# Patient Record
Sex: MALE | URBAN - METROPOLITAN AREA
[De-identification: names, ages, dates, MRNs, and addresses within clinical notes are randomized per-mention and may not be internally consistent; named-entity substitution may affect disease eponyms.]

---

## 2018-01-30 ENCOUNTER — OFFICE VISIT (OUTPATIENT)
Dept: FAMILY MEDICINE | Facility: CLINIC | Age: 38
End: 2018-01-30
Payer: MEDICAID

## 2018-01-30 VITALS
DIASTOLIC BLOOD PRESSURE: 70 MMHG | HEART RATE: 72 BPM | RESPIRATION RATE: 16 BRPM | SYSTOLIC BLOOD PRESSURE: 112 MMHG | HEIGHT: 78 IN | WEIGHT: 283 LBS | BODY MASS INDEX: 32.74 KG/M2 | TEMPERATURE: 98 F

## 2018-01-30 DIAGNOSIS — K21.9 GASTROESOPHAGEAL REFLUX DISEASE WITHOUT ESOPHAGITIS: ICD-10-CM

## 2018-01-30 DIAGNOSIS — M45.0 ANKYLOSING SPONDYLITIS OF MULTIPLE SITES IN SPINE: ICD-10-CM

## 2018-01-30 DIAGNOSIS — Z00.00 WELL ADULT EXAM: Primary | ICD-10-CM

## 2018-01-30 DIAGNOSIS — E66.9 OBESITY (BMI 30.0-34.9): ICD-10-CM

## 2018-01-30 DIAGNOSIS — H20.021 RECURRENT IRITIS OF RIGHT EYE: ICD-10-CM

## 2018-01-30 PROBLEM — M45.9 ANKYLOSING SPONDYLITIS: Status: ACTIVE | Noted: 2018-01-30

## 2018-01-30 PROBLEM — H20.029 IRITIS, RECURRENT: Status: ACTIVE | Noted: 2018-01-30

## 2018-01-30 PROBLEM — H20.9 IRITIS OF LEFT EYE: Status: ACTIVE | Noted: 2018-01-30

## 2018-01-30 LAB
BUN SERPL-MCNC: 14 MG/DL (ref 8–20)
CALCIUM SERPL-MCNC: 9.3 MG/DL (ref 7.7–10.4)
CHLORIDE: 105 MMOL/L (ref 98–110)
CO2 SERPL-SCNC: 26.7 MMOL/L (ref 22.8–31.6)
CREATININE: 1.02 MG/DL (ref 0.6–1.4)
GLUCOSE: 92 MG/DL (ref 70–99)
HBA1C MFR BLD: 5.1 % (ref 3.1–6.5)
POTASSIUM SERPL-SCNC: 4.1 MMOL/L (ref 3.5–5)
SODIUM: 140 MMOL/L (ref 134–144)

## 2018-01-30 PROCEDURE — 99395 PREV VISIT EST AGE 18-39: CPT | Mod: ,,, | Performed by: FAMILY MEDICINE

## 2018-01-30 RX ORDER — OMEPRAZOLE 40 MG/1
40 CAPSULE, DELAYED RELEASE ORAL DAILY
Qty: 30 CAPSULE | Refills: 11 | Status: SHIPPED | OUTPATIENT
Start: 2018-01-30 | End: 2019-07-22

## 2018-01-30 NOTE — PROGRESS NOTES
Subjective:       Patient ID: Davon Chery is a 37 y.o. male.    Chief Complaint: Annual Exam      Gastroesophageal Reflux   He reports no abdominal pain, no chest pain, no choking, no coughing, no nausea, no sore throat or no wheezing. This is a recurrent problem. Episode onset: years. The problem has been gradually worsening. Pertinent negatives include no fatigue.       Allergies and Medications:   Review of patient's allergies indicates:  No Known Allergies  Current Outpatient Prescriptions   Medication Sig Dispense Refill    omeprazole (PRILOSEC) 40 MG capsule Take 1 capsule (40 mg total) by mouth once daily. 30 capsule 11     No current facility-administered medications for this visit.        Family History:   History reviewed. No pertinent family history.    Social History:   Social History     Social History    Marital status: Single     Spouse name: N/A    Number of children: N/A    Years of education: N/A     Occupational History    Not on file.     Social History Main Topics    Smoking status: Never Smoker    Smokeless tobacco: Never Used    Alcohol use No    Drug use: No    Sexual activity: Not on file     Other Topics Concern    Not on file     Social History Narrative    No narrative on file       Review of Systems   Constitutional: Positive for unexpected weight change (20# gain over 18 months). Negative for activity change, appetite change, chills, diaphoresis, fatigue and fever.   HENT: Negative for congestion, dental problem, drooling, ear discharge, ear pain, facial swelling, hearing loss, mouth sores, nosebleeds, postnasal drip, rhinorrhea, sinus pain, sinus pressure, sneezing, sore throat, tinnitus, trouble swallowing and voice change.    Eyes: Negative for photophobia, pain, discharge, redness, itching and visual disturbance.        Ha iiritis in summer, resolved in one weeks   Respiratory: Negative for apnea, cough, choking, chest tightness, shortness of breath, wheezing and  stridor.    Cardiovascular: Negative for chest pain, palpitations and leg swelling.   Gastrointestinal: Positive for rectal pain (itching). Negative for abdominal distention, abdominal pain, anal bleeding, blood in stool, constipation, diarrhea, nausea and vomiting.        Gerd, chest discomfort.   Endocrine: Positive for cold intolerance. Negative for heat intolerance, polydipsia, polyphagia and polyuria.   Genitourinary: Negative for decreased urine volume, difficulty urinating, discharge, dysuria, enuresis, flank pain, frequency, genital sores, hematuria, penile pain, penile swelling, scrotal swelling, testicular pain and urgency.   Musculoskeletal: Negative for arthralgias, back pain, gait problem, joint swelling, myalgias, neck pain and neck stiffness.   Skin: Negative for color change, pallor, rash and wound.   Allergic/Immunologic: Negative for environmental allergies, food allergies and immunocompromised state.   Neurological: Negative for dizziness, tremors, seizures, syncope, facial asymmetry, speech difficulty, weakness, light-headedness, numbness and headaches.   Hematological: Negative for adenopathy. Does not bruise/bleed easily.   Psychiatric/Behavioral: Positive for sleep disturbance (snoozing). Negative for agitation, behavioral problems, confusion, decreased concentration, dysphoric mood, hallucinations, self-injury and suicidal ideas. The patient is not nervous/anxious and is not hyperactive.        Objective:     Vitals:    01/30/18 0857   BP: 112/70   Pulse: 72   Resp: 16   Temp: 97.5 °F (36.4 °C)        Physical Exam   Constitutional: He is oriented to person, place, and time. He appears well-developed and well-nourished.  Non-toxic appearance. He does not have a sickly appearance. He does not appear ill. No distress.   HENT:   Head: Normocephalic and atraumatic.   Right Ear: External ear normal.   Left Ear: External ear normal.   Nose: Nose normal.   Mouth/Throat: Oropharynx is clear and  moist. No oropharyngeal exudate.   Eyes: Conjunctivae and EOM are normal. Pupils are equal, round, and reactive to light. Right eye exhibits no discharge. Left eye exhibits no discharge. No scleral icterus.   Neck: Normal range of motion. Neck supple. No JVD present. No tracheal deviation present. No thyromegaly present.   Cardiovascular: Normal rate, normal heart sounds and intact distal pulses.  Exam reveals no gallop and no friction rub.    No murmur heard.  Pulmonary/Chest: Effort normal and breath sounds normal. No stridor. No respiratory distress. He has no wheezes. He has no rales. He exhibits no tenderness.   Abdominal: Soft. Bowel sounds are normal. He exhibits no distension and no mass. There is no tenderness. There is no rebound and no guarding. No hernia.   Genitourinary: Rectum normal, prostate normal, testes normal and penis normal. Rectal exam shows guaiac negative stool. Cremasteric reflex is present. Right testis shows no mass, no swelling and no tenderness. Right testis is descended. Cremasteric reflex is not absent on the right side. Left testis shows no mass, no swelling and no tenderness. Left testis is descended. Cremasteric reflex is not absent on the left side. Circumcised. No phimosis, paraphimosis, hypospadias, penile erythema or penile tenderness. No discharge found.   Musculoskeletal: He exhibits no edema, tenderness or deformity.   Lymphadenopathy:     He has no cervical adenopathy.   Neurological: He is alert and oriented to person, place, and time. He has normal reflexes. He displays normal reflexes. No cranial nerve deficit. He exhibits normal muscle tone. Coordination normal.   Skin: Skin is warm and dry. No rash noted. He is not diaphoretic. No erythema. No pallor.   Psychiatric: He has a normal mood and affect. His behavior is normal. Judgment and thought content normal.   Nursing note and vitals reviewed.      Assessment:       1. Well adult exam    2. Gastroesophageal reflux  disease without esophagitis    3. Obesity (BMI 30.0-34.9)    4. Recurrent iritis of right eye    5. Ankylosing spondylitis of multiple sites in spine        Plan:       Davon was seen today for annual exam.    Diagnoses and all orders for this visit:    Well adult exam  -     Hemoglobin A1c; Future  -     Lipid panel; Future  -     Basic metabolic panel; Future  -     Hemoglobin A1c  -     Lipid panel  -     Basic metabolic panel    Gastroesophageal reflux disease without esophagitis  -     omeprazole (PRILOSEC) 40 MG capsule; Take 1 capsule (40 mg total) by mouth once daily.    Obesity (BMI 30.0-34.9)    Recurrent iritis of right eye  -     Rheumatoid factor; Future  -     JEANNETTE; Future  -     CBC auto differential; Future  -     Ambulatory consult to Rheumatology  -     Rheumatoid factor  -     JEANNETTE    Ankylosing spondylitis of multiple sites in spine  -     Rheumatoid factor; Future  -     JEANNETTE; Future  -     CBC auto differential; Future  -     Ambulatory consult to Rheumatology  -     Rheumatoid factor  -     JEANNETTE         Follow-up in about 1 year (around 1/30/2019), or if symptoms worsen or fail to improve.

## 2018-01-30 NOTE — PATIENT INSTRUCTIONS
Diabetes: Learning About Serving and Portion Sizes     A good rule of thumb: Devote half your plate to vegetables and green salad. Split the other half between protein and starchy carbohydrates. Fruit makes a good dessert.     Servings and portions. Whats the difference? These terms can be very confusing. But learning to measure serving sizes can help you figure out how many carbohydrates (carbs) and other foods you eat each day. They are also powerful tools for managing your weight.  Servings and portions  Many different words are used to describe amounts of food. If your health care provider uses a term youre not sure of, dont be afraid to ask. It helps to know the difference between servings and portions:  · A serving size is a fixed size. Food producers use this term to describe their products. For example, the label on a cereal box could say that 1 cup of dry cereal = 1 serving.  · A portion (also called a helping) is how much you eat or how much you put on your plate at a meal. For example, you might eat 2 cups of cereal at breakfast.  Using serving information  The portion you choose to eat (such as 2 cups of cereal) may be more than one serving as listed on the food label (such as 1 cup of cereal). Thats why it helps to measure or weigh the food you eat. Because the food label values are based on servings, youll need to know how many servings you eat at one sitting.     Ounces: 2 to 3 ounces is about the size of your palm.       1 Cup: 1 cup (or a medium-sized piece) is about the size of your fist.       1/2 Cup: 1/2 cup is about the size of your cupped hand.      One tablespoon is about the size of your thumb.  One teaspoon is about the size of the tip of your thumb.  Keeping track of serving sizes  When youre planning for a snack or a meal, keep servings in mind. If you dont have measuring cups or a scale handy, there are ways to eyeball serving sizes, such as comparing your food to the size  of your hand (see pictures above).  Managing portion sizes  If your weight is a concern, reducing your portions can help. You can eat more than one serving of a food at once. But to keep from eating too much at one meal, learn how to manage your portions. A portion is the amount of each type of food on your plate. See the plate diagram for an example of balanced portions.  Date Last Reviewed: 3/1/2016  © 2730-0674 [employer]. [address] [place] [address], [place] [address]. All rights reserved. This information is not intended as a substitute for professional medical care. Always follow your healthcare professional's instructions.

## 2018-01-31 LAB — RHEUMATOID FACT SERPL-ACNC: <10 IU/ML (ref 0–13.9)

## 2018-02-15 ENCOUNTER — OFFICE VISIT (OUTPATIENT)
Dept: RHEUMATOLOGY | Facility: CLINIC | Age: 38
End: 2018-02-15
Payer: MEDICAID

## 2018-02-15 VITALS
SYSTOLIC BLOOD PRESSURE: 118 MMHG | BODY MASS INDEX: 32.97 KG/M2 | HEIGHT: 78 IN | WEIGHT: 285 LBS | DIASTOLIC BLOOD PRESSURE: 78 MMHG

## 2018-02-15 DIAGNOSIS — M45.9 ANKYLOSING SPONDYLITIS, UNSPECIFIED SITE OF SPINE: Primary | ICD-10-CM

## 2018-02-15 DIAGNOSIS — Z79.899 ENCOUNTER FOR LONG-TERM (CURRENT) DRUG USE: ICD-10-CM

## 2018-02-15 DIAGNOSIS — K21.9 GASTROESOPHAGEAL REFLUX DISEASE WITHOUT ESOPHAGITIS: ICD-10-CM

## 2018-02-15 LAB
ALBUMIN SERPL-MCNC: 4.2 G/DL (ref 3.1–4.7)
ALP SERPL-CCNC: 69 IU/L (ref 40–104)
ALT (SGPT): 31 IU/L (ref 3–33)
AST SERPL-CCNC: 28 IU/L (ref 10–40)
BASOPHILS NFR BLD: 0 K/UL (ref 0–0.2)
BASOPHILS NFR BLD: 0.4 %
BILIRUB SERPL-MCNC: 0.6 MG/DL (ref 0.3–1)
BUN SERPL-MCNC: 15 MG/DL (ref 8–20)
CALCIUM SERPL-MCNC: 9.3 MG/DL (ref 7.7–10.4)
CHLORIDE: 106 MMOL/L (ref 98–110)
CO2 SERPL-SCNC: 25.2 MMOL/L (ref 22.8–31.6)
CREATININE: 0.97 MG/DL (ref 0.6–1.4)
CRP SERPL-MCNC: 0.96 MG/DL (ref 0–1.4)
EOSINOPHIL NFR BLD: 0.2 K/UL (ref 0–0.7)
EOSINOPHIL NFR BLD: 2.1 %
ERYTHROCYTE [DISTWIDTH] IN BLOOD BY AUTOMATED COUNT: 13.4 % (ref 11.7–14.9)
GLUCOSE: 90 MG/DL (ref 70–99)
GRAN #: 4.5 K/UL (ref 1.4–6.5)
GRAN%: 63.1 %
HCT VFR BLD AUTO: 44.9 % (ref 39–55)
HGB BLD-MCNC: 15.4 G/DL (ref 14–16)
IMMATURE GRANS (ABS): 0 K/UL (ref 0–1)
IMMATURE GRANULOCYTES: 0.3 %
LYMPH #: 1.9 K/UL (ref 1.2–3.4)
LYMPH%: 26.9 %
MCH RBC QN AUTO: 28.8 PG (ref 25–35)
MCHC RBC AUTO-ENTMCNC: 34.3 G/DL (ref 31–36)
MCV RBC AUTO: 84.1 FL (ref 80–100)
MONO #: 0.5 K/UL (ref 0.1–0.6)
MONO%: 7.2 %
NUCLEATED RBCS: 0 %
PLATELET # BLD AUTO: 256 K/UL (ref 140–440)
PMV BLD AUTO: 11.4 FL (ref 8.8–12.7)
POTASSIUM SERPL-SCNC: 3.8 MMOL/L (ref 3.5–5)
PROT SERPL-MCNC: 7.5 G/DL (ref 6–8.2)
RBC # BLD AUTO: 5.34 M/UL (ref 4.3–5.9)
SODIUM: 139 MMOL/L (ref 134–144)
WBC # BLD AUTO: 7.2 K/UL (ref 5–10)

## 2018-02-15 PROCEDURE — 3008F BODY MASS INDEX DOCD: CPT | Mod: ,,, | Performed by: INTERNAL MEDICINE

## 2018-02-15 PROCEDURE — 99204 OFFICE O/P NEW MOD 45 MIN: CPT | Mod: ,,, | Performed by: INTERNAL MEDICINE

## 2018-02-15 NOTE — PROGRESS NOTES
[facility] RHEUMATOLOGY           New patient visit      Subjective:       Patient ID:   NAME: Davon Chery : 1980     37 y.o. male    Referring Doc: Jean-Paul Ibrahim MD  Other Physicians:    Chief Complaint:  Consult (new patient = referred by Dr. Hernandes); Ankylosing Spondylitis; and Iritis        HPI:   This pleasant young man was referred to me by his primary provider, Dr. Jean-Paul Ibrahim. He patient states he has been carrying a diagnosis of ankylosing spondylitis for approximately 10 years. Throughout that period, it seems he was treated with over-the-counter anti-inflammatories on an as-needed basis. He has had very little in the way of back pain or peripheral joint pain and nothing in the way of red, hot, and/or swollen joints. His symptoms possibly referable to the ankylosing spondylitis are limited to several outbreaks of rather significant iritis. He has also had multiple periods of heel pain which of course may suggest enthesitis. The patient states that he does have some degree of stiffness in the back that lasts approximately 30 minutes. He does not take any over-the-counter medication. He is not limited in any way by this process in terms of his job as an .   His past medical history is positive only for GERD.   The review of systems is entirely negative with regard to rheumatologic phenomena with the exception of the eye inflammation and the heel pain.    ROS:   GEN:      no fever, night sweats or weight loss  SKIN:     no rashes , erythema, bruising, or swelling, no Raynauds, no photosensitivity  HEENT: no HAs, no changes in vision, no mouth ulcers, no sicca symptoms, no scalp tenderness, jaw claudication.  CV:        no CP, SOB, PND, JETT or orthopnea,no palpitations  PULM:   no SOB, cough, hemoptysis, sputum or pleuritic pain  GI:         no abdominal pain, nausea, vomiting, constipation, diarrhea, melanotic stools, BRBPR, or hematemesis.no dysphagia  :       no hematuria,  "dysuria  NEURO:no paresthesias, headaches, visual disturbances, muscle weakness  MUSCULOSKELETAL:  No complaints of muscle or joint pain. 30 minutes of back stiffness each day  PSYCH: No insomnia, no significant depression, no anxiety    Medications:    Current Outpatient Prescriptions:     omeprazole (PRILOSEC) 40 MG capsule, Take 1 capsule (40 mg total) by mouth once daily., Disp: 30 capsule, Rfl: 11    FAMILY HISTORY: negative for Connective Tissue Disease  There are no end exam questions for this visit.      Review of patient's allergies indicates:  No Known Allergies          Objective:     Vitals:  Blood pressure 118/78, height 6' 7" (2.007 m), weight 129.3 kg (285 lb).    Physical Examination:   GEN:    wn/wd male in no apparent distress  SKIN:    no rashes, no lesions, no sclerodactyly, no Raynaud's, no periungual erythema  HEAD:   no alopecia, no scalp tenderness, no temporal artery tenderness or induration.  EYES:   no pallor, no icterus, PERRLA. No evidence of inflammation  ENT:     no thrush, no mucosal dryness or ulcerations, adequate oral hygiene & dentition.  NECK:  supple x 6, no masses, no thyromegaly, no lymphadenopathy.  CV:        S1 and S2 regular, no murmurs, gallop or rubs  CHEST: Normal respiratory effort;  normal breath sounds/no adventitious sounds. No signs of consolidation.  ABD:      non-tender and non-distended; soft; normal bowel sounds; no rebound/guarding or tenderness. No hepatosplenomegaly.  Musculoskeletal:  There is no evidence of limitation of range of motion of the spine. Posture is entirely normal. There is no evidence of peripheral inflammatory disease  EXTREM: no clubbing, cyanosis or edema. normal pulses.  NEURO: grossly intact; motor/sensory WNL; AAOx3; no tremors  PSYCH:  normal mood, affect and behavior            Labs:   No results found for: WBC, HGB, HCT, MCV, PLTCMP@  Sodium   Date Value Ref Range Status   01/30/2018 140 134 - 144 mmol/L      Potassium   Date Value " Ref Range Status   01/30/2018 4.1 3.5 - 5.0 mmol/L      Chloride   Date Value Ref Range Status   01/30/2018 105 98 - 110 mmol/L      CO2   Date Value Ref Range Status   01/30/2018 26.7 22.8 - 31.6 mmol/L      Glucose   Date Value Ref Range Status   01/30/2018 92 70 - 99 mg/dL      BUN, Bld   Date Value Ref Range Status   01/30/2018 14 8 - 20 mg/dL      Creatinine   Date Value Ref Range Status   01/30/2018 1.02 0.60 - 1.40 mg/dL      Calcium   Date Value Ref Range Status   01/30/2018 9.3 7.7 - 10.4 mg/dL      Rheumatoid Factor   Date Value Ref Range Status   01/30/2018 <10.0 0.0 - 13.9 IU/mL      Comment:     Performed at: [facility] [address], [id number] Jo MD, Phone:  [phone]         Radiology/Diagnostic Studies:    none    Assessment/Discussion/Plan:   37 y.o. male with a history of ankylosing spondylitis with recurrent iritis and likely enthesitis.      PLAN:  I have discussed with the patient the course of this disease The choices for treatment at this point would be anti-inflammatory medication and/or sulfasalazine or methotrexate.  The anti-inflammatories are unlikely to yield a great deal of benefit and of course, as he has GERD, it would probably be best to stay away from them. I am not entirely certain that he would benefit from either of the 2 drugs mentioned as his symptoms appear to be extra-articular. I might be willing however to start a course of methotrexate and see if we can eliminate the iritis attacks. I would prefer however to try and get him on a biologic such as Enbrel which is more likely to have benefits for both the central and peripheral symptoms. He does not have insurance and actually that may be helpful in securing a nimesh from the  for the medication.  I have given him literature on the disease and on Enbrel. I have asked him to consider the treatment and that we will discuss it at his next visit.   I did get labs for a B  27 and his C-reactive protein.      RTC:  I will see him back in 1 month.      Electronically signed by Karsten Ellis MD

## 2018-02-15 NOTE — PATIENT INSTRUCTIONS
Iritis    Ankylosing Spondylitis in Adults  Arthritis is a disease that affects joints in your body. Ankylosing spondylitis (AS) is a type of arthritis that attacks the spine. The name comes from the [place] language. Ankylosing means stiffening of the joints. Spondyl refers to the spine, or vertebrae.  What causes Ankylosing spondylitis?  Healthcare providers dont know exactly what causes AS. Genes may play a role. Thats because almost all cases of AS happen in people with a gene called HLA-B27. But only a small percentage of people with this gene actually get AS.  Young and old people can develop AS. But its more common in people ages 17 to 35. Men are more likely than women to have AS. You are also more likely to have it if someone else in your family had it.  Symptoms  Like other types of arthritis, AS causes pain and stiffness. The spine and other nearby joints, such as the hip, become inflamed. In serious cases, the disease may break down the joints. Bones may even fuse together.  Symptoms of AS may come and go. They often include:  Back pain, especially in the morning when waking up from sleep  Body aches, such as in the legs, shoulders, buttocks, or heels  Stiffness in the morning  Stooped posture to ease pain  Problems inhaling deeply if AS affects the joints between the ribs and the spine  Lack of appetite  Fatigue  Fever  Anemia  Some people with AS also have skin rashes and stomach illnesses. They may have eye problems, too. These include pain, redness, and sensitivity to light. Severe cases of the disease may damage organs such as the heart and lungs. Osteoporosis happens in about 50% of AS patients.   Diagnosing Ankylosing spondylitis  To diagnose AS, your healthcare provider will start with a physical exam. He or she will ask about your symptoms and medical history. X-rays of your spine and other joints may show joint damage. MRI testing (magnetic resonance imaging) may be done as well. Genetic  testing can find out if you have the HLA-B27 gene.  Your healthcare provider may also recommend a lab test that checks for inflammation.  An erythrocyte sedimentation rate test measures how quickly red blood cells fall to the bottom of a test tube. If you have inflammation from arthritis, the red blood cells will clump together and fall faster.  Treating Ankylosing spondylitis  AS cant be cured. But treatments can ease pain and stiffness. They can help you live a more active life. Your healthcare provider will choose the best treatment based on your overall health, the severity of your disease, and other factors.  Several types of medicine can reduce pain and inflammation. These medicines include:  Nonsteroidal anti-inflammatory drugs (NSAIDs), such as naproxen or ibuprofen  Corticosteroids, which can be injected into affected joints and areas.   Muscle relaxants  Biologic medicines  Disease-modifying antirheumatic drugs (DMARDs)  Alternative treatments may also be helpful. Speak with your healthcare provider about the potential benefits of acupuncture, massage, yoga, and TENS units (transcutaneous electrical nerve stimulation).  Quitting smoking may help.   Your healthcare provider may also recommend surgery. For instance, you may need to have a joint replaced. Other procedures remove damaged bone or insert rods into the spine.   Exercising regularly can help relieve your symptoms. Be sure to include activities that strengthen the back and increase flexibility and range of motion. Your healthcare provider may also suggest physical therapy. Maintaining proper posture is important, too.  Date Last Reviewed: 2/3/2016  © 0601-7020 [employer] [address] [place] [address], [place] [address]. All rights reserved. This information is not intended as a substitute for professional medical care. Always follow your healthcare professional's instructions.        The iris is the colored part of the eye that controls  the size of the pupil. Iritis is an inflammation of the iris. It can be caused by injury to the eye or disease elsewhere in the body. Diseases linked to iritis include gout, Lyme disease, inflammatory bowel disease, or and rheumatoid arthritis. Often, no cause is found.  Iritis usually develops suddenly in one eye. Symptoms include redness, pain in the eye or brow region, sensitivity to light, and blurred vision.  Iritis is treated with eye drops to dilate the pupil and reduce pain. This will cause your vision to be blurred from a few hours up to a week, depending on the medicine used. Steroid drops may also be prescribed, to reduce pain from inflammation. Iritis from eye injury usually resolves in 1 to 2 weeks. Iritis from other causes may take several weeks to months to resolve.  Home care  · Use eye drops as prescribed.  · Wear sunglasses to decrease light sensitivity and discomfort.  · If your eye is dilated or if you have been given an eye patch, your driving ability will be affected. Do not drive until the blurred vision wears off and you no longer need an eye patch.  · You may use acetaminophen or ibuprofen to control pain, unless another medicine was prescribed. (Note: If you have chronic liver or kidney disease, or if you have ever had a stomach ulcer or gastrointestinal bleeding, talk with your doctor before using these medicines.)  Follow-up care  Follow up with your healthcare provider, or as advised.  When to seek medical advice  Contact your healthcare provider right away if any of these occur:  · Symptoms do not start to improve after 1 week.  · Iritis from eye injury causes symptoms for more than 2 weeks.  · Pain increases.  · Your eye becomes red.  · You lose some or all of your vision.  Date Last Reviewed: 6/22/2015  © 0859-1106 [employer]. [address] [place] [address], [place] [address]. All rights reserved. This information is not intended as a substitute for professional medical care.  Always follow your healthcare professional's instructions.

## 2018-02-16 LAB — CCP ANTIBODIES IGG/IGA: 4 UNITS (ref 0–19)

## 2018-02-21 LAB — HLA B27 INTERPRETATION: POSITIVE

## 2018-03-19 ENCOUNTER — OFFICE VISIT (OUTPATIENT)
Dept: RHEUMATOLOGY | Facility: CLINIC | Age: 38
End: 2018-03-19
Payer: MEDICAID

## 2018-03-19 VITALS
BODY MASS INDEX: 32.64 KG/M2 | DIASTOLIC BLOOD PRESSURE: 82 MMHG | SYSTOLIC BLOOD PRESSURE: 116 MMHG | WEIGHT: 289.69 LBS

## 2018-03-19 DIAGNOSIS — Z15.89 HLA B27 (HLA B27 POSITIVE): Primary | ICD-10-CM

## 2018-03-19 DIAGNOSIS — M45.9 ANKYLOSING SPONDYLITIS, UNSPECIFIED SITE OF SPINE: ICD-10-CM

## 2018-03-19 PROCEDURE — 99214 OFFICE O/P EST MOD 30 MIN: CPT | Mod: ,,, | Performed by: INTERNAL MEDICINE

## 2018-03-19 RX ORDER — CHOLECALCIFEROL (VITAMIN D3) 125 MCG
CAPSULE ORAL
COMMUNITY
End: 2018-04-10 | Stop reason: ALTCHOICE

## 2018-03-19 NOTE — PROGRESS NOTES
The patient returned today for a discussion of the test results and the treatment plan.    He moved the Aleve, 440 mg, to bedtime and has noted a great deal of difference from that. He has virtually no morning stiffness now and has improved energy throughout the day-no doubt because he is sleeping better.    I informed him of the blood test results which showed a positive HLA-B27 but little else.  I also discussed the x-ray results with him which are entirely normal and do not yet indicate ankylosing spondylitis damage.    The patient states a preference for taking no medication other than Aleve at this time. He states that he feels perfectly well  and that he is not fond of taking medications. I did explain about the potential risks from recurrent iritis or enthesitis apparently,  that is a risk he is willing to take at this time.    Given his present feeling, there is no reason for him to regularly follow-up with me. I did make it clear that he is welcome to return at any time.  I have asked him to be certain to let me know if there are further outbreaks, particularly of the iritis.

## 2018-04-10 ENCOUNTER — OFFICE VISIT (OUTPATIENT)
Dept: FAMILY MEDICINE | Facility: CLINIC | Age: 38
End: 2018-04-10
Payer: MEDICAID

## 2018-04-10 VITALS
BODY MASS INDEX: 33.21 KG/M2 | HEART RATE: 74 BPM | SYSTOLIC BLOOD PRESSURE: 112 MMHG | RESPIRATION RATE: 16 BRPM | HEIGHT: 78 IN | WEIGHT: 287 LBS | DIASTOLIC BLOOD PRESSURE: 84 MMHG | TEMPERATURE: 97 F

## 2018-04-10 DIAGNOSIS — K21.9 GASTROESOPHAGEAL REFLUX DISEASE WITHOUT ESOPHAGITIS: Primary | ICD-10-CM

## 2018-04-10 DIAGNOSIS — E66.9 OBESITY (BMI 30.0-34.9): ICD-10-CM

## 2018-04-10 PROCEDURE — 99213 OFFICE O/P EST LOW 20 MIN: CPT | Mod: ,,, | Performed by: FAMILY MEDICINE

## 2018-04-10 NOTE — PROGRESS NOTES
"Subjective:       Patient ID: Davon Chery is a 37 y.o. male.    Chief Complaint: Follow-up (from rheumatologist )      Patient comes in for a "follow-up today" my last visit with him was in January and I referred him to Dr. fabien elliotttis he did receive steroid drops and an evaluation which came up with the subclinical ankylosing spondylitis he opted to not treated aggressively and is doing relatively well his iritis has resolved. His last blood work check his cholesterol and blood sugars were normal and this will follow be followed up on an annual basis.        Allergies and Medications:   Review of patient's allergies indicates:  No Known Allergies  Current Outpatient Prescriptions   Medication Sig Dispense Refill    NAPROXEN SODIUM (ALEVE ORAL) Take by mouth.      omeprazole (PRILOSEC) 40 MG capsule Take 1 capsule (40 mg total) by mouth once daily. 30 capsule 11     No current facility-administered medications for this visit.        Family History:   History reviewed. No pertinent family history.    Social History:   Social History     Social History    Marital status:      Spouse name: N/A    Number of children: N/A    Years of education: N/A     Occupational History    Not on file.     Social History Main Topics    Smoking status: Never Smoker    Smokeless tobacco: Never Used    Alcohol use No    Drug use: No    Sexual activity: Not on file     Other Topics Concern    Not on file     Social History Narrative    No narrative on file       Review of Systems    Objective:     Vitals:    04/10/18 0918   BP: 112/84   Pulse: 74   Resp: 16   Temp: 97.4 °F (36.3 °C)        Physical Exam   Constitutional: He appears well-developed and well-nourished.   HENT:   Head: Normocephalic and atraumatic.   Eyes: Conjunctivae and EOM are normal. Pupils are equal, round, and reactive to light.   Cardiovascular: Normal rate, regular rhythm, normal heart sounds and intact distal pulses.  Exam reveals no gallop " and no friction rub.    No murmur heard.  Pulmonary/Chest: Effort normal and breath sounds normal. No respiratory distress. He has no wheezes. He has no rales. He exhibits no tenderness.   Genitourinary: Rectum normal, prostate normal and penis normal.       Assessment:       1. Gastroesophageal reflux disease without esophagitis    2. Obesity (BMI 30.0-34.9)        Plan:       Davon was seen today for follow-up.    Diagnoses and all orders for this visit:    Gastroesophageal reflux disease without esophagitis    Obesity (BMI 30.0-34.9)       portion control  Follow-up in about 1 year (around 4/10/2019).

## 2019-04-10 ENCOUNTER — TELEPHONE (OUTPATIENT)
Dept: FAMILY MEDICINE | Facility: CLINIC | Age: 39
End: 2019-04-10

## 2019-07-22 ENCOUNTER — OFFICE VISIT (OUTPATIENT)
Dept: RHEUMATOLOGY | Facility: CLINIC | Age: 39
End: 2019-07-22

## 2019-07-22 VITALS — DIASTOLIC BLOOD PRESSURE: 85 MMHG | BODY MASS INDEX: 33.35 KG/M2 | SYSTOLIC BLOOD PRESSURE: 127 MMHG | WEIGHT: 296 LBS

## 2019-07-22 DIAGNOSIS — M45.9 ANKYLOSING SPONDYLITIS, UNSPECIFIED SITE OF SPINE: Primary | ICD-10-CM

## 2019-07-22 PROCEDURE — 99214 OFFICE O/P EST MOD 30 MIN: CPT | Mod: ,,, | Performed by: INTERNAL MEDICINE

## 2019-07-22 PROCEDURE — 99214 PR OFFICE/OUTPT VISIT, EST, LEVL IV, 30-39 MIN: ICD-10-PCS | Mod: ,,, | Performed by: INTERNAL MEDICINE

## 2019-07-22 RX ORDER — ACETAMINOPHEN 325 MG/1
325 TABLET ORAL
COMMUNITY

## 2019-07-22 RX ORDER — PREDNISONE 10 MG/1
TABLET ORAL
Qty: 21 TABLET | Refills: 0 | Status: SHIPPED | OUTPATIENT
Start: 2019-07-22 | End: 2019-08-12 | Stop reason: ALTCHOICE

## 2019-07-22 NOTE — PROGRESS NOTES
[facility]           Follow-up visit    Notes dictated via Dragon to EPIC. Please forgive any unintended errors.  Subjective:       Patient ID:   NAME: Davon Chery : 1980     39 y.o. male    Referring Doc: No ref. provider found  Other Physicians:    Chief Complaint:  Ankylosing spondylitis      HPI/Interval History:  The patient has had lumbar pain and stiffness over the last 2-3 weeks. It is severe enough that he is having difficulty functioning on any level. He had not been involved in any sort of trauma or unusual work activities. It has not been responsive to Naprosyn. He has not noted any peripheral joint swelling. He is stiff in the morning approximately 2 hours.      ROS:   GEN:    no fever, night sweats or weight loss  SKIN:   no rashes, erythema, bruising, or swelling, no Raynauds, no photosensitivity  HEENT: no changes in vision, no mouth ulcers, no sicca symptoms, no scalp tenderness, no jaw claudication.  CV:      no CP, PND, JETT, orthopnea, no palpitations  PULM: no SOB, cough, hemoptysis, sputum or pleuritic pain  GI:       no GERD, no dysphagia, no hematemesis, no abdominal pain, nausea, vomiting, constipation, diarrhea, melanotic stools, BRBPR  :      no hematuria, dysuria  NEURO: no paresthesias, headaches, acute visual disturbances  MUSCULOSKELETAL:  Back pain and stiffness as above  PSYCH:   No insomnia, no increased anxiety, no increased depression    Past Medical/Surgical History:  Past Medical History:   Diagnosis Date    Ankylosing spondylitis     GERD (gastroesophageal reflux disease)      Past Surgical History:   Procedure Laterality Date    VASECTOMY      WISDOM TOOTH EXTRACTION         Allergies:  Review of patient's allergies indicates:  No Known Allergies    Social/Family History:  Social History     Socioeconomic History    Marital status:      Spouse name: Not on file    Number of children: Not on file    Years of education: Not on file     Highest education level: Not on file   Occupational History    Not on file   Social Needs    Financial resource strain: Not on file    Food insecurity:     Worry: Not on file     Inability: Not on file    Transportation needs:     Medical: Not on file     Non-medical: Not on file   Tobacco Use    Smoking status: Never Smoker    Smokeless tobacco: Never Used   Substance and Sexual Activity    Alcohol use: No    Drug use: No    Sexual activity: Not on file   Lifestyle    Physical activity:     Days per week: Not on file     Minutes per session: Not on file    Stress: Not on file   Relationships    Social connections:     Talks on phone: Not on file     Gets together: Not on file     Attends Anabaptist service: Not on file     Active member of club or organization: Not on file     Attends meetings of clubs or organizations: Not on file     Relationship status: Not on file   Other Topics Concern    Not on file   Social History Narrative    Not on file     History reviewed. No pertinent family history.  FAMILY HISTORY: negative for Connective Tissue Disease      Medications:    Current Outpatient Medications:     acetaminophen (TYLENOL) 325 MG tablet, Take 325 mg by mouth as needed for Pain., Disp: , Rfl:     NAPROXEN SODIUM (ALEVE ORAL), Take by mouth., Disp: , Rfl:     predniSONE (DELTASONE) 10 MG tablet, One tablet twice daily for one week, then one tablet daily for one week., Disp: 21 tablet, Rfl: 0      Objective:     Vitals:  Blood pressure 127/85, weight 134.3 kg (296 lb).    Physical Examination:   GEN: wn/wd male in no apparent distress  SKIN: no rashes, no sclerodactyly, no Raynaud's, no periungual erythema, no digital tip ulcerations, no nailbed pitting  HEAD: no alopecia, no scalp tenderness, no temporal artery tenderness or induration.  EYES: no pallor, no icterus, PERRLA  ENT:  no thrush, no mucosal dryness or ulcerations, adequate oral hygiene & dentition.  NECK: supple x 6, no masses, no  thyromegaly, no lymphadenopathy.  CV:   S1 and S2 regular, no murmurs, gallop or rubs  CHEST: Normal respiratory effort;  normal breath sounds/no adventitious sounds. No signs of consolidation.  ABD: non-tender and non-distended; soft; normal bowel sounds; no rebound/guarding or tenderness. No hepatosplenomegaly.  Musculoskeletal:  Flexion of the lumbar spine is limited to 60°. Shoulders could not be measured. Posterior is normal. The gait is unremarkable.  There is moderate lumbar paraspinal spasm. There is no tenderness on compression of the SI joints  EXTREM: no clubbing, cyanosis or edema. normal pulses.  NEURO:  grossly intact; motor/sensory WNL; no tremors  PSYCH:  normal mood, affect and behavior    Labs:   Lab Results   Component Value Date    WBC 7.2 02/15/2018    HGB 15.4 02/15/2018    HCT 44.9 02/15/2018    MCV 84.1 02/15/2018     02/15/2018   CMP@  Sodium   Date Value Ref Range Status   02/15/2018 139 134 - 144 mmol/L      Potassium   Date Value Ref Range Status   02/15/2018 3.8 3.5 - 5.0 mmol/L      Chloride   Date Value Ref Range Status   02/15/2018 106 98 - 110 mmol/L      CO2   Date Value Ref Range Status   02/15/2018 25.2 22.8 - 31.6 mmol/L      Glucose   Date Value Ref Range Status   02/15/2018 90 70 - 99 mg/dL      BUN, Bld   Date Value Ref Range Status   02/15/2018 15 8 - 20 mg/dL      Creatinine   Date Value Ref Range Status   02/15/2018 0.97 0.60 - 1.40 mg/dL      Calcium   Date Value Ref Range Status   02/15/2018 9.3 7.7 - 10.4 mg/dL      Total Protein   Date Value Ref Range Status   02/15/2018 7.5 6.0 - 8.2 g/dL      Albumin   Date Value Ref Range Status   02/15/2018 4.2 3.1 - 4.7 g/dL      Total Bilirubin   Date Value Ref Range Status   02/15/2018 0.6 0.3 - 1.0 mg/dL      Alkaline Phosphatase   Date Value Ref Range Status   02/15/2018 69 40 - 104 IU/L      AST   Date Value Ref Range Status   02/15/2018 28 10 - 40 IU/L      CRP   Date Value Ref Range Status   02/15/2018 0.96 0.00 -  1.40 mg/dL      Rheumatoid Factor   Date Value Ref Range Status   01/30/2018 <10.0 0.0 - 13.9 IU/mL      Comment:     Performed at: [facility] [address], [place], [place], [id number] Jo MD, Phone:  [phone]       Radiology/Diagnostic Studies:    None    Assessment/Discussion/Plan:   39 y.o. male with ankylosing spondylitis, newly symptomatic    PLAN:  I have discussed the options with him. We have agreed to a two-week steroid trial. He will get prednisone 20 mg daily for 1 week followed by 10 mg daily for the next.  We have discussed Enbrel with him and have provided literature on it for he and his wife to review.    RTC:  I will see him back in 3 weeks    Electronically signed by Karsten Ellis MD

## 2019-08-12 ENCOUNTER — OFFICE VISIT (OUTPATIENT)
Dept: RHEUMATOLOGY | Facility: CLINIC | Age: 39
End: 2019-08-12

## 2019-08-12 VITALS
SYSTOLIC BLOOD PRESSURE: 116 MMHG | BODY MASS INDEX: 32.31 KG/M2 | WEIGHT: 286.81 LBS | DIASTOLIC BLOOD PRESSURE: 80 MMHG

## 2019-08-12 DIAGNOSIS — M45.9 ANKYLOSING SPONDYLITIS, UNSPECIFIED SITE OF SPINE: Primary | ICD-10-CM

## 2019-08-12 DIAGNOSIS — Z79.899 ENCOUNTER FOR LONG-TERM (CURRENT) DRUG USE: ICD-10-CM

## 2019-08-12 DIAGNOSIS — Z15.89 HLA B27 (HLA B27 POSITIVE): ICD-10-CM

## 2019-08-12 PROCEDURE — 99213 PR OFFICE/OUTPT VISIT, EST, LEVL III, 20-29 MIN: ICD-10-PCS | Mod: S$GLB,,, | Performed by: INTERNAL MEDICINE

## 2019-08-12 PROCEDURE — 99213 OFFICE O/P EST LOW 20 MIN: CPT | Mod: S$GLB,,, | Performed by: INTERNAL MEDICINE

## 2019-08-12 NOTE — PROGRESS NOTES
[facility]HE[facility]           Follow-up visit    Notes dictated via Dragon to EPIC. Please forgive any unintended errors.  Subjective:       Patient ID:   NAME: Davon Chery : 1980     39 y.o. male    Referring Doc: No ref. provider found  Other Physicians:    Chief Complaint:  Ankylosing spondylitis      HPI/Interval History:   Patient is doing fairly well. He got some general relief with the steroid trial he was given. However, he did not find it overwhelmingly helpful. He does note that he has difficulty performing certain activities that at age 39 he should be able to perform easily.This includes getting up from the floor or bending to tie his shoes. He has back pain daily now though not necessarily morning stiffness.    ROS:   GEN:    no fever, night sweats or weight loss  SKIN:   no rashes, erythema, bruising, or swelling, no Raynauds, no photosensitivity  HEENT: no changes in vision, no mouth ulcers, no sicca symptoms, no scalp tenderness, no jaw claudication.  CV:      no CP, PND, JETT, orthopnea, no palpitations  PULM: no SOB, cough, hemoptysis, sputum or pleuritic pain  GI:       no GERD, no dysphagia, no hematemesis, no abdominal pain, nausea, vomiting, constipation, diarrhea, melanotic stools, BRBPR  :      no hematuria, dysuria  NEURO: no paresthesias, headaches, acute visual disturbances  MUSCULOSKELETAL:  Joint pain centered in the lower back without any peripherally red, hot, and/or swollen joints  PSYCH:   No insomnia, no increased anxiety, no increased depression    Past Medical/Surgical History:  Past Medical History:   Diagnosis Date    Ankylosing spondylitis     GERD (gastroesophageal reflux disease)      Past Surgical History:   Procedure Laterality Date    VASECTOMY      WISDOM TOOTH EXTRACTION         Allergies:  Review of patient's allergies indicates:  No Known Allergies    Social/Family History:  Social History     Socioeconomic History    Marital status:       Spouse name: Not on file    Number of children: Not on file    Years of education: Not on file    Highest education level: Not on file   Occupational History    Not on file   Social Needs    Financial resource strain: Not on file    Food insecurity:     Worry: Not on file     Inability: Not on file    Transportation needs:     Medical: Not on file     Non-medical: Not on file   Tobacco Use    Smoking status: Never Smoker    Smokeless tobacco: Never Used   Substance and Sexual Activity    Alcohol use: No    Drug use: No    Sexual activity: Not on file   Lifestyle    Physical activity:     Days per week: Not on file     Minutes per session: Not on file    Stress: Not on file   Relationships    Social connections:     Talks on phone: Not on file     Gets together: Not on file     Attends Judaism service: Not on file     Active member of club or organization: Not on file     Attends meetings of clubs or organizations: Not on file     Relationship status: Not on file   Other Topics Concern    Not on file   Social History Narrative    Not on file     History reviewed. No pertinent family history.  FAMILY HISTORY: negative for Connective Tissue Disease      Medications:    Current Outpatient Medications:     acetaminophen (TYLENOL) 325 MG tablet, Take 325 mg by mouth as needed for Pain., Disp: , Rfl:     NAPROXEN SODIUM (ALEVE ORAL), Take by mouth., Disp: , Rfl:       Objective:     Vitals:  Blood pressure 116/80, weight 130.1 kg (286 lb 12.8 oz).    Physical Examination:   GEN: wn/wd male in no apparent distress  SKIN: no rashes, no sclerodactyly, no Raynaud's, no periungual erythema, no digital tip ulcerations, no nailbed pitting  HEAD: no alopecia, no scalp tenderness, no temporal artery tenderness or induration.  EYES: no pallor, no icterus, PERRLA  ENT:  no thrush, no mucosal dryness or ulcerations, adequate oral hygiene & dentition.  NECK: supple x 6, no masses, no thyromegaly, no  lymphadenopathy.  CV:   S1 and S2 regular, no murmurs, gallop or rubs  CHEST: Normal respiratory effort;  normal breath sounds/no adventitious sounds. No signs of consolidation.  ABD: non-tender and non-distended; soft; normal bowel sounds; no rebound/guarding or tenderness. No hepatosplenomegaly.  Musculoskeletal:  No evidence of active inflammatory arthritis. No decrease in range of motion of the back  EXTREM: no clubbing, cyanosis or edema. normal pulses.  NEURO:  grossly intact; motor/sensory WNL; no tremors  PSYCH:  normal mood, affect and behavior    Labs:   Lab Results   Component Value Date    WBC 7.2 02/15/2018    HGB 15.4 02/15/2018    HCT 44.9 02/15/2018    MCV 84.1 02/15/2018     02/15/2018   CMP@  Sodium   Date Value Ref Range Status   02/15/2018 139 134 - 144 mmol/L      Potassium   Date Value Ref Range Status   02/15/2018 3.8 3.5 - 5.0 mmol/L      Chloride   Date Value Ref Range Status   02/15/2018 106 98 - 110 mmol/L      CO2   Date Value Ref Range Status   02/15/2018 25.2 22.8 - 31.6 mmol/L      Glucose   Date Value Ref Range Status   02/15/2018 90 70 - 99 mg/dL      BUN, Bld   Date Value Ref Range Status   02/15/2018 15 8 - 20 mg/dL      Creatinine   Date Value Ref Range Status   02/15/2018 0.97 0.60 - 1.40 mg/dL      Calcium   Date Value Ref Range Status   02/15/2018 9.3 7.7 - 10.4 mg/dL      Total Protein   Date Value Ref Range Status   02/15/2018 7.5 6.0 - 8.2 g/dL      Albumin   Date Value Ref Range Status   02/15/2018 4.2 3.1 - 4.7 g/dL      Total Bilirubin   Date Value Ref Range Status   02/15/2018 0.6 0.3 - 1.0 mg/dL      Alkaline Phosphatase   Date Value Ref Range Status   02/15/2018 69 40 - 104 IU/L      AST   Date Value Ref Range Status   02/15/2018 28 10 - 40 IU/L      CRP   Date Value Ref Range Status   02/15/2018 0.96 0.00 - 1.40 mg/dL      Rheumatoid Factor   Date Value Ref Range Status   01/30/2018 <10.0 0.0 - 13.9 IU/mL      Comment:     Performed at: [facility]  [id number] [place] [address], [place], [place], [id number] Jo MD, Phone:  [phone]       Radiology/Diagnostic Studies:    None    Assessment/Discussion/Plan:   39 y.o. male with B 27 positive ankylosing spondylitis Aleve 2-4 tablets daily as needed    PLAN:  I have again discussed Enbrel with him. They do not yet have insurance but are working toward it and believe they will have insurance in the next month or two. His wife is certainly in favor of him taking something to prevent the evident decline in function. He is not quite so convinced.    RTC:  I will see him back in 4 months.    Electronically signed by Karsten Ellis MD

## 2019-12-12 ENCOUNTER — OFFICE VISIT (OUTPATIENT)
Dept: RHEUMATOLOGY | Facility: CLINIC | Age: 39
End: 2019-12-12
Payer: COMMERCIAL

## 2019-12-12 VITALS — DIASTOLIC BLOOD PRESSURE: 78 MMHG | SYSTOLIC BLOOD PRESSURE: 119 MMHG | WEIGHT: 297.38 LBS | BODY MASS INDEX: 33.5 KG/M2

## 2019-12-12 DIAGNOSIS — M45.9 ANKYLOSING SPONDYLITIS, UNSPECIFIED SITE OF SPINE: Primary | ICD-10-CM

## 2019-12-12 DIAGNOSIS — Z79.899 ENCOUNTER FOR LONG-TERM (CURRENT) DRUG USE: ICD-10-CM

## 2019-12-12 DIAGNOSIS — H20.021 RECURRENT IRITIS OF RIGHT EYE: ICD-10-CM

## 2019-12-12 PROCEDURE — 99213 OFFICE O/P EST LOW 20 MIN: CPT | Mod: S$GLB,,, | Performed by: INTERNAL MEDICINE

## 2019-12-12 PROCEDURE — 3008F BODY MASS INDEX DOCD: CPT | Mod: S$GLB,,, | Performed by: INTERNAL MEDICINE

## 2019-12-12 PROCEDURE — 3008F PR BODY MASS INDEX (BMI) DOCUMENTED: ICD-10-PCS | Mod: S$GLB,,, | Performed by: INTERNAL MEDICINE

## 2019-12-12 PROCEDURE — 99213 PR OFFICE/OUTPT VISIT, EST, LEVL III, 20-29 MIN: ICD-10-PCS | Mod: S$GLB,,, | Performed by: INTERNAL MEDICINE

## 2019-12-12 NOTE — PROGRESS NOTES
[facility] RHEUMATOLOG[facility]           Follow-up visit    Notes dictated to M*Modal. Please forgive any unintended errors.  Subjective:       Patient ID:   NAME: Davon Chery : 1980     39 y.o. male    Referring Doc: No ref. provider found  Other Physicians:    Chief Complaint:  Ankylosing spondylitis      HPI/Interval History:  The patient is doing very well.  He has no back pain or back stiffness in the morning, no joint pain or swelling, and absolutely no limitations to his hobbies, chores, and ADLs.    ROS:   GEN:    no fever, night sweats or weight loss  SKIN:   no rashes, erythema, bruising, or swelling, no Raynauds, no photosensitivity  HEENT: no changes in vision, no mouth ulcers, no sicca symptoms, no scalp tenderness, no jaw claudication.  CV:      no CP, PND, JETT, orthopnea, no palpitations  PULM: no SOB, cough, hemoptysis, sputum or pleuritic pain  GI:       no GERD, no dysphagia, no hematemesis, no abdominal pain, nausea, vomiting, constipation, diarrhea, melanotic stools, BRBPR  :      no hematuria, dysuria  NEURO: no paresthesias, headaches, acute visual disturbances  MUSCULOSKELETAL:  No muscle or joint complaints  PSYCH:   No increased insomnia, no increased anxiety, no increased depression    Past Medical/Surgical History:  Past Medical History:   Diagnosis Date    Ankylosing spondylitis     GERD (gastroesophageal reflux disease)      Past Surgical History:   Procedure Laterality Date    VASECTOMY      WISDOM TOOTH EXTRACTION         Allergies:  Review of patient's allergies indicates:  No Known Allergies    Social/Family History:  Social History     Socioeconomic History    Marital status:      Spouse name: Not on file    Number of children: Not on file    Years of education: Not on file    Highest education level: Not on file   Occupational History    Not on file   Social Needs    Financial resource strain: Not on file    Food insecurity:     Worry: Not on file      Inability: Not on file    Transportation needs:     Medical: Not on file     Non-medical: Not on file   Tobacco Use    Smoking status: Never Smoker    Smokeless tobacco: Never Used   Substance and Sexual Activity    Alcohol use: No    Drug use: No    Sexual activity: Not on file   Lifestyle    Physical activity:     Days per week: Not on file     Minutes per session: Not on file    Stress: Not on file   Relationships    Social connections:     Talks on phone: Not on file     Gets together: Not on file     Attends Alevism service: Not on file     Active member of club or organization: Not on file     Attends meetings of clubs or organizations: Not on file     Relationship status: Not on file   Other Topics Concern    Not on file   Social History Narrative    Not on file     History reviewed. No pertinent family history.  FAMILY HISTORY: negative for Connective Tissue Disease      Medications:    Current Outpatient Medications:     acetaminophen (TYLENOL) 325 MG tablet, Take 325 mg by mouth as needed for Pain., Disp: , Rfl:     NAPROXEN SODIUM (ALEVE ORAL), Take by mouth., Disp: , Rfl:       Objective:     Vitals:  Blood pressure 119/78, weight 134.9 kg (297 lb 6.4 oz).    Physical Examination:   GEN: wn/wd male in no apparent distress  SKIN: no rashes, no sclerodactyly, no Raynaud's, no periungual erythema, no digital tip ulcerations, no nailbed pitting  HEAD: no alopecia, no scalp tenderness, no temporal artery tenderness or induration.  EYES: no pallor, no icterus, PERRLA  ENT:  no thrush, no mucosal dryness or ulcerations, adequate oral hygiene & dentition.  NECK: supple x 6, no masses, no thyromegaly, no lymphadenopathy.  CV:   S1 and S2 regular, no murmurs, gallop or rubs  CHEST: Normal respiratory effort;  normal breath sounds/no adventitious sounds. No signs of consolidation.  ABD: non-tender and non-distended; soft; normal bowel sounds; no rebound/guarding or tenderness. No  hepatosplenomegaly.  Musculoskeletal:  Back range of motion full.  No evidence of peripheral arthritis  EXTREM: no clubbing, cyanosis or edema. normal pulses.  NEURO:  grossly intact; motor/sensory WNL; no tremors  PSYCH:  normal mood, affect and behavior    Labs:   Lab Results   Component Value Date    WBC 7.2 02/15/2018    HGB 15.4 02/15/2018    HCT 44.9 02/15/2018    MCV 84.1 02/15/2018     02/15/2018   CMP@  Sodium   Date Value Ref Range Status   02/15/2018 139 134 - 144 mmol/L      Potassium   Date Value Ref Range Status   02/15/2018 3.8 3.5 - 5.0 mmol/L      Chloride   Date Value Ref Range Status   02/15/2018 106 98 - 110 mmol/L      CO2   Date Value Ref Range Status   02/15/2018 25.2 22.8 - 31.6 mmol/L      Glucose   Date Value Ref Range Status   02/15/2018 90 70 - 99 mg/dL      BUN, Bld   Date Value Ref Range Status   02/15/2018 15 8 - 20 mg/dL      Creatinine   Date Value Ref Range Status   02/15/2018 0.97 0.60 - 1.40 mg/dL      Calcium   Date Value Ref Range Status   02/15/2018 9.3 7.7 - 10.4 mg/dL      Total Protein   Date Value Ref Range Status   02/15/2018 7.5 6.0 - 8.2 g/dL      Albumin   Date Value Ref Range Status   02/15/2018 4.2 3.1 - 4.7 g/dL      Total Bilirubin   Date Value Ref Range Status   02/15/2018 0.6 0.3 - 1.0 mg/dL      Alkaline Phosphatase   Date Value Ref Range Status   02/15/2018 69 40 - 104 IU/L      AST   Date Value Ref Range Status   02/15/2018 28 10 - 40 IU/L      CRP   Date Value Ref Range Status   02/15/2018 0.96 0.00 - 1.40 mg/dL      Rheumatoid Factor   Date Value Ref Range Status   01/30/2018 <10.0 0.0 - 13.9 IU/mL      Comment:     Performed at: [facility] [address], [id number] Jo MD, Phone:  [phone]       Radiology/Diagnostic Studies:    None    Assessment/Discussion/Plan:   39 y.o. male with ankylosing spondylitis-asymptomatic on very occasional Aleve or Tylenol    PLAN:  There is no need to put him on a biologic  at this time.  We have discussed the signs and symptoms of ankylosing spondylitis and he will notify me of any of those changes.  Otherwise, I will see him back in 6 months.    RTC:  6 months or sooner if needed    Electronically signed by MD NEETA Mcqueen

## 2019-12-13 LAB
ALBUMIN SERPL-MCNC: 4.4 G/DL (ref 3.5–5.5)
ALBUMIN/GLOB SERPL: 1.9 {RATIO} (ref 1.2–2.2)
ALP SERPL-CCNC: 66 IU/L (ref 39–117)
ALT SERPL-CCNC: 28 IU/L (ref 0–44)
AST SERPL-CCNC: 26 IU/L (ref 0–40)
BASOPHILS # BLD AUTO: 0 X10E3/UL (ref 0–0.2)
BASOPHILS NFR BLD AUTO: 0 %
BILIRUB SERPL-MCNC: 0.5 MG/DL (ref 0–1.2)
BUN SERPL-MCNC: 15 MG/DL (ref 6–24)
BUN/CREAT SERPL: 14 (ref 9–20)
CALCIUM SERPL-MCNC: 9.3 MG/DL (ref 8.7–10.2)
CHLORIDE SERPL-SCNC: 102 MMOL/L (ref 96–106)
CO2 SERPL-SCNC: 23 MMOL/L (ref 20–29)
CREAT SERPL-MCNC: 1.09 MG/DL (ref 0.76–1.27)
CRP SERPL-MCNC: 4 MG/L (ref 0–10)
EOSINOPHIL # BLD AUTO: 0.1 X10E3/UL (ref 0–0.4)
EOSINOPHIL NFR BLD AUTO: 2 %
ERYTHROCYTE [DISTWIDTH] IN BLOOD BY AUTOMATED COUNT: 16.2 % (ref 12.3–15.4)
ERYTHROCYTE [SEDIMENTATION RATE] IN BLOOD BY WESTERGREN METHOD: 8 MM/HR (ref 0–15)
GLOBULIN SER CALC-MCNC: 2.3 G/DL (ref 1.5–4.5)
GLUCOSE SERPL-MCNC: 120 MG/DL (ref 65–99)
HCT VFR BLD AUTO: 45.6 % (ref 37.5–51)
HGB BLD-MCNC: 15.1 G/DL (ref 13–17.7)
IMM GRANULOCYTES # BLD AUTO: 0 X10E3/UL (ref 0–0.1)
IMM GRANULOCYTES NFR BLD AUTO: 0 %
LYMPHOCYTES # BLD AUTO: 1.9 X10E3/UL (ref 0.7–3.1)
LYMPHOCYTES NFR BLD AUTO: 28 %
MCH RBC QN AUTO: 27.8 PG (ref 26.6–33)
MCHC RBC AUTO-ENTMCNC: 33.1 G/DL (ref 31.5–35.7)
MCV RBC AUTO: 84 FL (ref 79–97)
MONOCYTES # BLD AUTO: 0.3 X10E3/UL (ref 0.1–0.9)
MONOCYTES NFR BLD AUTO: 4 %
NEUTROPHILS # BLD AUTO: 4.6 X10E3/UL (ref 1.4–7)
NEUTROPHILS NFR BLD AUTO: 66 %
PLATELET # BLD AUTO: 256 X10E3/UL (ref 150–450)
POTASSIUM SERPL-SCNC: 4.1 MMOL/L (ref 3.5–5.2)
PROT SERPL-MCNC: 6.7 G/DL (ref 6–8.5)
RBC # BLD AUTO: 5.43 X10E6/UL (ref 4.14–5.8)
SODIUM SERPL-SCNC: 143 MMOL/L (ref 134–144)
WBC # BLD AUTO: 7 X10E3/UL (ref 3.4–10.8)

## 2020-06-15 ENCOUNTER — OFFICE VISIT (OUTPATIENT)
Dept: RHEUMATOLOGY | Facility: CLINIC | Age: 40
End: 2020-06-15

## 2020-06-15 VITALS
SYSTOLIC BLOOD PRESSURE: 117 MMHG | WEIGHT: 291.81 LBS | BODY MASS INDEX: 32.87 KG/M2 | TEMPERATURE: 98 F | DIASTOLIC BLOOD PRESSURE: 82 MMHG

## 2020-06-15 DIAGNOSIS — Z79.899 ENCOUNTER FOR LONG-TERM (CURRENT) DRUG USE: ICD-10-CM

## 2020-06-15 DIAGNOSIS — M45.9 ANKYLOSING SPONDYLITIS, UNSPECIFIED SITE OF SPINE: Primary | ICD-10-CM

## 2020-06-15 PROCEDURE — 99214 PR OFFICE/OUTPT VISIT, EST, LEVL IV, 30-39 MIN: ICD-10-PCS | Mod: S$GLB,,, | Performed by: INTERNAL MEDICINE

## 2020-06-15 PROCEDURE — 99214 OFFICE O/P EST MOD 30 MIN: CPT | Mod: S$GLB,,, | Performed by: INTERNAL MEDICINE

## 2020-06-15 NOTE — PROGRESS NOTES
"      [facility] RHEUMATOLOGY           Follow-up visit    Notes dictated to M*Modal. Please forgive any unintended errors.  Subjective:       Patient ID:   NAME: Davon Chery : 1980     40 y.o. male    Referring Doc: No ref. provider found  Other Physicians:    Chief Complaint:  Ankylosing spondylitis      HPI/Interval History:  The patient is doing well.  He has no complaints of increased back pain or stiffness.  He has not noted any chores or activities that he has become unable to perform.  He has noted some dependent edema frequently in the evenings.  He was concerned that it might represent his "arthritis spreading.    ROS:   GEN:    no fever, night sweats or weight loss  SKIN:   no rashes, erythema, bruising, or swelling, no Raynauds, no photosensitivity  HEENT: no changes in vision, no mouth ulcers, no sicca symptoms, no scalp tenderness, no jaw claudication.  CV:      no CP, PND, JETT, orthopnea, no palpitations  PULM: no SOB, cough, hemoptysis, sputum or pleuritic pain  GI:       no GERD, no dysphagia, no hematemesis, no abdominal pain, nausea, vomiting, constipation, diarrhea, melanotic stools, BRBPR  :      no hematuria, dysuria  NEURO: no paresthesias, headaches, acute visual disturbances  MUSCULOSKELETAL:  No red, hot, and/or swollen joints  PSYCH:   No increased insomnia, no increased anxiety, no increased depression    Past Medical/Surgical History:  Past Medical History:   Diagnosis Date    Ankylosing spondylitis     GERD (gastroesophageal reflux disease)      Past Surgical History:   Procedure Laterality Date    VASECTOMY      WISDOM TOOTH EXTRACTION         Allergies:  Review of patient's allergies indicates:  No Known Allergies    Social/Family History:  Social History     Socioeconomic History    Marital status:      Spouse name: Not on file    Number of children: Not on file    Years of education: Not on file    Highest education level: Not on file   Occupational History    " Not on file   Social Needs    Financial resource strain: Not on file    Food insecurity     Worry: Not on file     Inability: Not on file    Transportation needs     Medical: Not on file     Non-medical: Not on file   Tobacco Use    Smoking status: Never Smoker    Smokeless tobacco: Never Used   Substance and Sexual Activity    Alcohol use: No    Drug use: No    Sexual activity: Not on file   Lifestyle    Physical activity     Days per week: Not on file     Minutes per session: Not on file    Stress: Not on file   Relationships    Social connections     Talks on phone: Not on file     Gets together: Not on file     Attends [employer] service: Not on file     Active member of club or organization: Not on file     Attends meetings of clubs or organizations: Not on file     Relationship status: Not on file   Other Topics Concern    Not on file   Social History Narrative    Not on file     History reviewed. No pertinent family history.  FAMILY HISTORY: negative for Connective Tissue Disease      Medications:    Current Outpatient Medications:     acetaminophen (TYLENOL) 325 MG tablet, Take 325 mg by mouth as needed for Pain., Disp: , Rfl:     NAPROXEN SODIUM (ALEVE ORAL), Take by mouth., Disp: , Rfl:       Objective:     Vitals:  Blood pressure 117/82, temperature 97.9 °F (36.6 °C), weight 132.4 kg (291 lb 12.8 oz).    Physical Examination:   GEN: wn/wd male in no apparent distress  SKIN: no rashes, no sclerodactyly, no Raynaud's, no periungual erythema, no digital tip ulcerations, no nailbed pitting  HEAD: no alopecia, no scalp tenderness, no temporal artery tenderness or induration.  EYES: no pallor, no icterus, PERRLA  ENT:  no thrush, no mucosal dryness or ulcerations, adequate oral hygiene & dentition.  NECK: supple x 6, no masses, no thyromegaly, no lymphadenopathy.  CV:   S1 and S2 regular, no murmurs, gallop or rubs  CHEST: Normal respiratory effort;  normal breath sounds/no adventitious sounds. No  signs of consolidation.  ABD: non-tender and non-distended; soft; normal bowel sounds; no rebound/guarding or tenderness. No hepatosplenomegaly.  Musculoskeletal:  Lumbar flexion unchanged.  No evidence of inflammatory arthritis  EXTREM: no clubbing, cyanosis, + trace bipedal edema. normal pulses.  Homans negative x 2  NEURO:  grossly intact; motor/sensory WNL; no tremors  PSYCH:  normal mood, affect and behavior    Labs:   Lab Results   Component Value Date    WBC 7.0 12/12/2019    HGB 15.1 12/12/2019    HCT 45.6 12/12/2019    MCV 84 12/12/2019     12/12/2019   CMP@  Sodium   Date Value Ref Range Status   12/12/2019 143 134 - 144 mmol/L Final   02/15/2018 139 134 - 144 mmol/L      Potassium   Date Value Ref Range Status   12/12/2019 4.1 3.5 - 5.2 mmol/L Final     Chloride   Date Value Ref Range Status   12/12/2019 102 96 - 106 mmol/L Final   02/15/2018 106 98 - 110 mmol/L      CO2   Date Value Ref Range Status   12/12/2019 23 20 - 29 mmol/L Final     Glucose   Date Value Ref Range Status   12/12/2019 120 (H) 65 - 99 mg/dL Final   02/15/2018 90 70 - 99 mg/dL      BUN, Bld   Date Value Ref Range Status   12/12/2019 15 6 - 24 mg/dL Final     Creatinine   Date Value Ref Range Status   12/12/2019 1.09 0.76 - 1.27 mg/dL Final   02/15/2018 0.97 0.60 - 1.40 mg/dL      Calcium   Date Value Ref Range Status   12/12/2019 9.3 8.7 - 10.2 mg/dL Final     Total Protein   Date Value Ref Range Status   12/12/2019 6.7 6.0 - 8.5 g/dL Final     Albumin   Date Value Ref Range Status   12/12/2019 4.4 3.5 - 5.5 g/dL Final   02/15/2018 4.2 3.1 - 4.7 g/dL      Total Bilirubin   Date Value Ref Range Status   12/12/2019 0.5 0.0 - 1.2 mg/dL Final     Alkaline Phosphatase   Date Value Ref Range Status   12/12/2019 66 39 - 117 IU/L Final     AST   Date Value Ref Range Status   12/12/2019 26 0 - 40 IU/L Final     ALT   Date Value Ref Range Status   12/12/2019 28 0 - 44 IU/L Final     CRP   Date Value Ref Range Status   12/12/2019 4 0 - 10  mg/L Final     Rheumatoid Factor   Date Value Ref Range Status   01/30/2018 <10.0 0.0 - 13.9 IU/mL      Comment:     Performed at: [facility] [address], [place], [place], [id number] Jo MD, Phone:  [phone]       Radiology/Diagnostic Studies:    None    Assessment/Discussion/Plan:   40 y.o. male with ankylosing spondylitis-stable on over-the-counter medications  2) dependent edema    PLAN:  I do not see an indication at present to place him on medication for AS.  The patient is in agreement.    I discussed the dependent edema problem with him.  I have explained that this is cardiac, not arthritis driven.  I reviewed the need to reduce sodium in the diet, to elevate the ankles higher than the heart when possible, and the negative impact of all NSAIDs--including Aleve--on fluid retention.  I provided him with printed literature on avoiding high sodium food.  In routine blood testing was ordered.    RTC:  I will see him back in 6 months or sooner if needed    Electronically signed by Karsten Ellis MD

## 2020-06-15 NOTE — PATIENT INSTRUCTIONS
Tips for Using Less Salt    Most people with heart problems need to eat less salt (sodium). Reducing the amount of salt you eat may help control your blood pressure. The higher your blood pressure, the greater your risk for heart disease, stroke, blindness, and kidney problems.  At the store  · Make low-salt choices by reading labels carefully. Look for the total amount of sodium per serving.  · Use more fresh food. Buy more fruits and vegetables. Select lean meats, fish, and poultry.  · Use fewer frozen, canned, and packaged foods which often contain a lot of sodium.  · Use plain frozen vegetables without sauces or toppings. These products are often low- or no-sodium.  · Opt for reduced-sodium or no-salt-added versions of canned vegetables and soups.  In the kitchen  · Don't add salt to food when you're cooking. Season with flavorings such as onion, garlic, pepper, salt-free herbal blends, and lemon or lime juice.  · Use a cookbook containing low-salt recipes. It can give you ideas for tasty meals that are healthy for your heart.  · Sprinkle salt-free herbal blends on vegetables and meat.  · Drain and rinse canned foods, such as canned beans and vegetables, before cooking or eating.  Eating out  · Tell the  you're on a low-salt diet. Ask questions about the menu.  · Order fish, chicken, and meat broiled, baked, poached, or grilled without salt, butter, or breading.  · Use lemon, pepper, and salt-free herb mixes to add flavor.  · Choose plain steamed rice, boiled noodles, and baked or boiled potatoes. Top potatoes with chives and a little sour cream.     Beware! Salt goes by many other names. Limit foods with these words listed as ingredients: salt, sodium, soy sauce, baking soda, baking powder, MSG, monosodium, Na (the chemical symbol for sodium). Some antacids are also high in salt.   Date Last Reviewed: 6/19/2015  © 2302-8943 [employer]. [address] [place] [address], [place] [address]. All rights  reserved. This information is not intended as a substitute for professional medical care. Always follow your healthcare professional's instructions.        Leg Swelling in Both Legs    Swelling of the feet, ankles, and legs is called edema. It is caused by excess fluid that has collected in the tissues. Extra fluid in the body settles in the lowest part because of gravity. This is why the legs and feet are most affected.  Some of the causes for edema include:  · Disease of the heart like congestive heart failure  · Standing or sitting for long periods of time  · Infection of the feet or legs  · Blood pooling in the veins of your legs (venous insufficiency)  · Dilated veins in your lower leg (varicose veins)  · Garters or other clothing that is tight on your legs. This will cause blood to pool in your legs because the clothing limits blood flow.  · Some medicines such as hormones like birth control pills, some blood pressure medicines like calcium channel blockers (amlodipine) and steroids, some antidepressants like MAO inhibitors and tricyclics  · Menstrual periods that cause you to retain fluids  · Many types of renal disease  · Liver failure or cirrhosis  · Pregnancy, some swelling is normal, but a sudden increase in leg swelling or weight gain can be a sign of a dangerous complication of pregnancy  · Poor nutrition  · Thyroid disease  Medical treatment will depend on what is causing the swelling in your legs. Your healthcare provider may prescribe water pills (diuretics) to get rid of the extra fluid.  Home care  Follow these guidelines when caring for yourself at home:  · Don't wear clothing like garters that is tight on your legs.  · Keep your legs up while lying or sitting.  · If infection, injury, or recent surgery is causing the swelling, stay off your legs as much as possible until symptoms get better.  · If your healthcare provider says that your leg swelling is caused by venous insufficiency or varicose  veins, don't sit or  one place for long periods of time. Take breaks and walk about every few hours. Brisk walking is a good exercise. It helps circulate the blood that has collected in your leg. Talk with your provider about using support stockings to stop daytime leg swelling.  · If your provider says that heart disease is causing your leg swelling, follow a low-salt diet to stop extra fluid from staying in your body. You may also need medicine.  Follow-up care  Follow up with your healthcare provider, or as advised.  When to seek medical advice  Call your healthcare provider right away if any of these occur:  · New shortness of breath or chest pain  · Shortness of breath or chest pain that gets worse  · Swelling in both legs or ankles that gets worse  · Swelling of the abdomen  · Redness, warmth, or swelling in one leg  · Fever of 100.4ºF (38ºC) or higher, or as directed by your healthcare provider  · Yellow color to your skin or eyes  · Rapid, unexplained weight gain  · Having to sleep upright or use an increased number of pillows  Date Last Reviewed: 3/31/2016  © 2132-9610 [employer]. [address] [place] [address], [place] [address]. All rights reserved. This information is not intended as a substitute for professional medical care. Always follow your healthcare professional's instructions.

## 2020-06-16 LAB
ALBUMIN SERPL-MCNC: 4.4 G/DL (ref 4–5)
ALBUMIN/GLOB SERPL: 1.7 {RATIO} (ref 1.2–2.2)
ALP SERPL-CCNC: 62 IU/L (ref 39–117)
ALT SERPL-CCNC: 16 IU/L (ref 0–44)
AST SERPL-CCNC: 17 IU/L (ref 0–40)
BASOPHILS # BLD AUTO: 0 X10E3/UL (ref 0–0.2)
BASOPHILS NFR BLD AUTO: 0 %
BILIRUB SERPL-MCNC: 0.2 MG/DL (ref 0–1.2)
BUN SERPL-MCNC: 17 MG/DL (ref 6–24)
BUN/CREAT SERPL: 15 (ref 9–20)
CALCIUM SERPL-MCNC: 10 MG/DL (ref 8.7–10.2)
CHLORIDE SERPL-SCNC: 104 MMOL/L (ref 96–106)
CO2 SERPL-SCNC: 23 MMOL/L (ref 20–29)
CREAT SERPL-MCNC: 1.12 MG/DL (ref 0.76–1.27)
CRP SERPL-MCNC: 4 MG/L (ref 0–10)
EOSINOPHIL # BLD AUTO: 0.1 X10E3/UL (ref 0–0.4)
EOSINOPHIL NFR BLD AUTO: 2 %
ERYTHROCYTE [DISTWIDTH] IN BLOOD BY AUTOMATED COUNT: 13.7 % (ref 11.6–15.4)
GLOBULIN SER CALC-MCNC: 2.6 G/DL (ref 1.5–4.5)
GLUCOSE SERPL-MCNC: 94 MG/DL (ref 65–99)
HCT VFR BLD AUTO: 48.3 % (ref 37.5–51)
HGB BLD-MCNC: 15.7 G/DL (ref 13–17.7)
IMM GRANULOCYTES # BLD AUTO: 0 X10E3/UL (ref 0–0.1)
IMM GRANULOCYTES NFR BLD AUTO: 0 %
LYMPHOCYTES # BLD AUTO: 1.9 X10E3/UL (ref 0.7–3.1)
LYMPHOCYTES NFR BLD AUTO: 25 %
MCH RBC QN AUTO: 28.2 PG (ref 26.6–33)
MCHC RBC AUTO-ENTMCNC: 32.5 G/DL (ref 31.5–35.7)
MCV RBC AUTO: 87 FL (ref 79–97)
MONOCYTES # BLD AUTO: 0.5 X10E3/UL (ref 0.1–0.9)
MONOCYTES NFR BLD AUTO: 6 %
NEUTROPHILS # BLD AUTO: 5 X10E3/UL (ref 1.4–7)
NEUTROPHILS NFR BLD AUTO: 67 %
PLATELET # BLD AUTO: 299 X10E3/UL (ref 150–450)
POTASSIUM SERPL-SCNC: 4.1 MMOL/L (ref 3.5–5.2)
PROT SERPL-MCNC: 7 G/DL (ref 6–8.5)
RBC # BLD AUTO: 5.57 X10E6/UL (ref 4.14–5.8)
SODIUM SERPL-SCNC: 141 MMOL/L (ref 134–144)
WBC # BLD AUTO: 7.5 X10E3/UL (ref 3.4–10.8)

## 2021-01-21 ENCOUNTER — OFFICE VISIT (OUTPATIENT)
Dept: RHEUMATOLOGY | Facility: CLINIC | Age: 41
End: 2021-01-21
Payer: COMMERCIAL

## 2021-01-21 VITALS
DIASTOLIC BLOOD PRESSURE: 79 MMHG | BODY MASS INDEX: 35.08 KG/M2 | WEIGHT: 311.38 LBS | TEMPERATURE: 98 F | SYSTOLIC BLOOD PRESSURE: 117 MMHG

## 2021-01-21 DIAGNOSIS — M45.9 ANKYLOSING SPONDYLITIS, UNSPECIFIED SITE OF SPINE: Primary | ICD-10-CM

## 2021-01-21 PROCEDURE — 1125F AMNT PAIN NOTED PAIN PRSNT: CPT | Mod: S$GLB,,, | Performed by: INTERNAL MEDICINE

## 2021-01-21 PROCEDURE — 3008F BODY MASS INDEX DOCD: CPT | Mod: S$GLB,,, | Performed by: INTERNAL MEDICINE

## 2021-01-21 PROCEDURE — 3008F PR BODY MASS INDEX (BMI) DOCUMENTED: ICD-10-PCS | Mod: S$GLB,,, | Performed by: INTERNAL MEDICINE

## 2021-01-21 PROCEDURE — 99213 OFFICE O/P EST LOW 20 MIN: CPT | Mod: S$GLB,,, | Performed by: INTERNAL MEDICINE

## 2021-01-21 PROCEDURE — 1125F PR PAIN SEVERITY QUANTIFIED, PAIN PRESENT: ICD-10-PCS | Mod: S$GLB,,, | Performed by: INTERNAL MEDICINE

## 2021-01-21 PROCEDURE — 99213 PR OFFICE/OUTPT VISIT, EST, LEVL III, 20-29 MIN: ICD-10-PCS | Mod: S$GLB,,, | Performed by: INTERNAL MEDICINE

## 2022-11-14 DIAGNOSIS — R06.83 SNORING: Primary | ICD-10-CM

## 2022-11-14 DIAGNOSIS — G47.33 OSA (OBSTRUCTIVE SLEEP APNEA): ICD-10-CM
